# Patient Record
Sex: MALE | Race: WHITE | NOT HISPANIC OR LATINO | Employment: OTHER | URBAN - METROPOLITAN AREA
[De-identification: names, ages, dates, MRNs, and addresses within clinical notes are randomized per-mention and may not be internally consistent; named-entity substitution may affect disease eponyms.]

---

## 2019-08-21 ENCOUNTER — OUTSIDE FACILITY SERVICE (OUTPATIENT)
Dept: CARDIOLOGY | Facility: CLINIC | Age: 74
End: 2019-08-21

## 2019-08-21 PROCEDURE — 93018 CV STRESS TEST I&R ONLY: CPT | Performed by: INTERNAL MEDICINE

## 2019-09-04 ENCOUNTER — CONSULT (OUTPATIENT)
Dept: CARDIOLOGY | Facility: CLINIC | Age: 74
End: 2019-09-04

## 2019-09-04 VITALS
WEIGHT: 266 LBS | OXYGEN SATURATION: 97 % | HEIGHT: 67 IN | HEART RATE: 69 BPM | SYSTOLIC BLOOD PRESSURE: 162 MMHG | DIASTOLIC BLOOD PRESSURE: 96 MMHG | BODY MASS INDEX: 41.75 KG/M2

## 2019-09-04 DIAGNOSIS — R94.39 ABNORMAL MYOCARDIAL PERFUSION STUDY: Primary | ICD-10-CM

## 2019-09-04 DIAGNOSIS — E78.5 DYSLIPIDEMIA: ICD-10-CM

## 2019-09-04 DIAGNOSIS — I10 ESSENTIAL HYPERTENSION: ICD-10-CM

## 2019-09-04 PROCEDURE — 99203 OFFICE O/P NEW LOW 30 MIN: CPT | Performed by: INTERNAL MEDICINE

## 2019-09-04 RX ORDER — OMEPRAZOLE 20 MG/1
20 CAPSULE, DELAYED RELEASE ORAL DAILY
COMMUNITY

## 2019-09-04 RX ORDER — ASPIRIN 81 MG/1
81 TABLET ORAL DAILY
COMMUNITY

## 2019-09-04 RX ORDER — MELOXICAM 15 MG/1
15 TABLET ORAL DAILY
COMMUNITY
End: 2020-09-16

## 2019-09-04 RX ORDER — NEBIVOLOL 10 MG/1
10 TABLET ORAL DAILY
COMMUNITY

## 2019-09-04 NOTE — PROGRESS NOTES
Subjective:     Encounter Date:09/04/2019    Primary Care Physician: Juan Wong MD      Patient ID: Tonya Scott is a 73 y.o. male.    Chief Complaint:Abnormal ECG    PROBLEM LIST:  1. Abnormal MPS/coronary artery disease  a. 8/2019 MPS with fixed inferior defect. EF 50%.  2. Hypertension  3. Sleep apnea on CPAP  4. ADD  5. Arthritis  6. BPH  7. Renal cell carcinoma  a. Partial nephrectomy  8. PUD  9. History of hyperthyroidism treated with radiation therapy  10. Restless leg syndrome  11. GERD  12. Surgeries:  a. Cataract extraction  b. Gastric banding  c. Parathyroid surgery for benign nodule  d. Right partial nephrectomy  e. Right total hip arthroplasty    No Known Allergies      Current Outpatient Medications:   •  amLODIPine (NORVASC) 5 MG tablet, Take 5 mg by mouth daily., Disp: , Rfl:   •  Biotin 5000 MCG capsule, Take 1 tablet by mouth daily., Disp: , Rfl:   •  carbidopa-levodopa (SINEMET)  MG per tablet, Take 1 tablet by mouth every night., Disp: , Rfl:   •  cholecalciferol (VITAMIN D3) 1000 UNITS tablet, Take 3,000 Units by mouth daily., Disp: , Rfl:   •  FLUoxetine (PROzac) 10 MG capsule, Take 10 mg by mouth daily., Disp: , Rfl:   •  losartan (COZAAR) 50 MG tablet, Take 100 mg by mouth daily., Disp: , Rfl:   •  Multiple Vitamins-Minerals (CENTRUM SILVER ULTRA MENS PO), Take 1 tablet by mouth daily., Disp: , Rfl:   •  Omega-3-acid Ethyl Esters (LOVAZA PO), Take 1 tablet by mouth daily., Disp: , Rfl:   •  pravastatin (PRAVACHOL) 40 MG tablet, Take 40 mg by mouth every night., Disp: , Rfl:   •  silodosin (RAPAFLO) 4 MG capsule capsule, Take 8 mg by mouth daily with breakfast., Disp: , Rfl:         History of Present Illness    Patient is a 73-year-old  male who we are seeing today for further evaluation of abnormal myocardial perfusion study.  He has no previous history of coronary disease.  He was recently seen by his primary care physician and given multiple risk factors and no  recent ischemic evaluation it was felt that he should undergo this.  Patient denies any chest pain, pressure, tightness.  Denies any increased shortness of breath.  No syncope, near-syncope, or edema.  Notes that he recently walked 4 miles and was only limited secondary to some arthritic knee pain.  His stress test suggested an old inferior myocardial infarction and due to this he was referred here for further evaluation.  Patient notes that he had been previously told by EKG that he had a previous heart attack but this was felt to be due to body habitus and probably lead placement per his report.    The following portions of the patient's history were reviewed and updated as appropriate: allergies, current medications, past family history, past medical history, past social history, past surgical history and problem list.    Family history: Father with history of myocardial infarction at age 78    Social History     Tobacco Use   • Smoking status: Former Smoker     Years: 5.00   • Smokeless tobacco: Former User     Types: Chew     Quit date: 2011   • Tobacco comment: INTERMITTENT, SOCIALLY   Substance Use Topics   • Alcohol use: Yes     Alcohol/week: 0.6 oz     Types: 1 Cans of beer per week     Comment: MAYBE 1 A WEEK OR LESS   • Drug use: No         Review of Systems   Constitution: Negative for fever, weakness and malaise/fatigue.   HENT: Positive for hearing loss. Negative for nosebleeds.    Eyes: Negative for redness and visual disturbance.   Cardiovascular: Negative for orthopnea, palpitations and paroxysmal nocturnal dyspnea.   Respiratory: Positive for snoring. Negative for cough, sputum production and wheezing.    Hematologic/Lymphatic: Negative for bleeding problem.   Skin: Negative for flushing, itching and rash.   Musculoskeletal: Positive for arthritis. Negative for falls, joint pain and muscle cramps.   Gastrointestinal: Negative for abdominal pain, diarrhea, heartburn, nausea and vomiting.  "  Genitourinary: Negative for hematuria.   Neurological: Negative for excessive daytime sleepiness, dizziness, headaches and tremors.   Psychiatric/Behavioral: Negative for substance abuse. The patient is not nervous/anxious.           Objective:   /96 (BP Location: Left arm)   Pulse 69   Ht 170.2 cm (67\")   Wt 121 kg (266 lb)   SpO2 97%   BMI 41.66 kg/m²          Physical Exam   Constitutional: He is oriented to person, place, and time. He appears well-developed and well-nourished.   obese   HENT:   Head: Normocephalic and atraumatic.   Eyes: Right eye exhibits no discharge. Left eye exhibits no discharge.   Neck: No JVD present. No tracheal deviation present.   Cardiovascular: Normal rate, regular rhythm, normal heart sounds and intact distal pulses. Exam reveals no friction rub.   No murmur heard.  Pulmonary/Chest: Effort normal and breath sounds normal. No respiratory distress.   Abdominal: Soft. Bowel sounds are normal. There is no tenderness.   Musculoskeletal: He exhibits no edema or deformity.   Neurological: He is alert and oriented to person, place, and time.   Skin: Skin is warm and dry.       Procedures          Assessment:   Assessment/Plan      Tonya was seen today for abnormal ecg.    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  No current anginal symptoms.  Medical therapy.    Essential hypertension, elevated today.    Dyslipidemia, on statin therapy.      Plan:  1. Discussed options with the patient regarding his abnormal stress test.  He is completely asymptomatic, and functional class I.  Given his medical therapy for CAD, and asymptomatic status, as well as the lack of ischemia we discussed the options of invasive therapy versus ongoing medical therapy.  We both agree that ongoing medical therapy is most appropriate at this time.  He is to start an exercise program and weight loss.  If he develops exertional symptoms " consistent with angina, we can reconsider invasive therapy at that time.  But for now would simply continue ongoing medical therapy.       Doris ACEVEDO scribed portions of this dictation for Dr. Kennedy Perez.    Dictated utilizing Dragon dictation

## 2020-09-16 ENCOUNTER — OFFICE VISIT (OUTPATIENT)
Dept: CARDIOLOGY | Facility: CLINIC | Age: 75
End: 2020-09-16

## 2020-09-16 VITALS
WEIGHT: 259 LBS | DIASTOLIC BLOOD PRESSURE: 92 MMHG | HEIGHT: 67 IN | SYSTOLIC BLOOD PRESSURE: 142 MMHG | OXYGEN SATURATION: 95 % | BODY MASS INDEX: 40.65 KG/M2 | HEART RATE: 63 BPM

## 2020-09-16 DIAGNOSIS — I25.10 CORONARY ARTERY DISEASE INVOLVING NATIVE CORONARY ARTERY OF NATIVE HEART WITHOUT ANGINA PECTORIS: ICD-10-CM

## 2020-09-16 DIAGNOSIS — E78.00 PURE HYPERCHOLESTEROLEMIA: ICD-10-CM

## 2020-09-16 DIAGNOSIS — I10 ESSENTIAL HYPERTENSION: Primary | ICD-10-CM

## 2020-09-16 PROCEDURE — 99213 OFFICE O/P EST LOW 20 MIN: CPT | Performed by: INTERNAL MEDICINE

## 2020-09-16 NOTE — PROGRESS NOTES
"Subjective:     Encounter Date:09/16/2020      Patient ID: Tonya Scott is a 74 y.o. male.    Chief Complaint: essential hypertension      PROBLEM LIST:  1. Abnormal MPS/coronary artery disease  a. 8/2019 MPS with fixed inferior defect. EF 50%.  2. Hypertension  3. Sleep apnea on CPAP  4. ADD  5. Arthritis  6. BPH  7. Renal cell carcinoma  a. Partial nephrectomy  8. PUD  9. History of hyperthyroidism treated with radiation therapy  10. Restless leg syndrome  11. GERD  12. Surgeries:  a. Cataract extraction  b. Gastric banding  c. Parathyroid surgery for benign nodule  d. Right partial nephrectomy  e. Right total hip arthroplasty    History of Present Illness  Patient returns today for follow up with a history of coronary artery disease abnormal EKG and myocardial vision study.  Since her last visit, he is limited by his left knee orthopedic issues which he is currently getting stem cell treatment for which is improving it somewhat.  He denies any exertional chest pain dyspnea or claudication says \"I can do what I need to\".  But is limited by his orthopedic issues as described above.  Denies orthopnea PND he does wear his CPAP device.  No tachypalpitations..     No Known Allergies      Current Outpatient Medications:   •  amLODIPine (NORVASC) 10 MG tablet, Take 10 mg by mouth Daily., Disp: , Rfl:   •  aspirin 81 MG EC tablet, Take 81 mg by mouth Daily., Disp: , Rfl:   •  Biotin 1000 MCG tablet, Take 2 tablets by mouth Daily., Disp: , Rfl:   •  carbidopa-levodopa (SINEMET)  MG per tablet, Take 1 tablet by mouth every night., Disp: , Rfl:   •  cholecalciferol (VITAMIN D3) 1000 UNITS tablet, Take 3,000 Units by mouth daily., Disp: , Rfl:   •  FLUoxetine (PROzac) 20 MG capsule, Take 20 mg by mouth Daily., Disp: , Rfl:   •  losartan (COZAAR) 100 MG tablet, Take 100 mg by mouth Daily., Disp: , Rfl:   •  Multiple Vitamins-Minerals (CENTRUM SILVER ULTRA MENS PO), Take 1 tablet by mouth daily., Disp: , Rfl:   •  " "nebivolol (BYSTOLIC) 10 MG tablet, Take 10 mg by mouth Daily., Disp: , Rfl:   •  omeprazole (priLOSEC) 20 MG capsule, Take 20 mg by mouth Daily., Disp: , Rfl:   •  vitamin E 100 UNIT capsule, Take 100 Units by mouth Daily., Disp: , Rfl:     The following portions of the patient's history were reviewed and updated as appropriate: allergies, current medications, past family history, past medical history, past social history, past surgical history and problem list.    Review of Systems   Constitution: Negative.   Cardiovascular: Negative for chest pain, dyspnea on exertion, leg swelling, palpitations and syncope.   Respiratory: Negative.  Negative for shortness of breath.    Hematologic/Lymphatic: Negative for bleeding problem. Does not bruise/bleed easily.   Skin: Negative for rash.   Musculoskeletal: Positive for arthritis. Negative for muscle weakness and myalgias.   Gastrointestinal: Negative for heartburn, nausea and vomiting.   Neurological: Negative for dizziness, light-headedness, loss of balance and numbness.          Objective:   Blood pressure 142/92, pulse 63, height 170.2 cm (67\"), weight 117 kg (259 lb), SpO2 95 %.      Vitals signs reviewed.   Constitutional:       Appearance: Well-developed and not in distress.   Neck:      Thyroid: No thyromegaly.      Vascular: No carotid bruit or JVD.   Pulmonary:      Breath sounds: Normal breath sounds.   Cardiovascular:      Regular rhythm.      No gallop. No S3 and S4 gallop.   Edema:     Peripheral edema absent.   Abdominal:      General: Bowel sounds are normal.      Palpations: Abdomen is soft. There is no abdominal mass.      Tenderness: There is no abdominal tenderness.   Musculoskeletal:         General: No deformity.      Extremities: No clubbing present.  Skin:     General: Skin is warm and dry.      Findings: No rash.   Neurological:      Mental Status: Alert and oriented to person, place, and time.         Lab Review:    Procedures        Assessment: "   Tonya was seen today for essential hypertension.    Diagnoses and all orders for this visit:    Essential hypertension    Coronary artery disease involving native coronary artery of native heart without angina pectoris    Pure hypercholesterolemia        Impression  1. Coronary artery disease: Patient with EKG, and myocardial perfusion evidence (2019) of possible prior inferior wall myocardial infarction, which would be asymptomatic.  2. 2.  Hypertension controlled on current losartan and amlodipine  3. 3.  Dyslipidemia on moderate dose statin last LDL at goal per primary physician.    Recommendations  1.  Discussed once again is previous abnormal EKG and myocardial study suggesting prior inferior infarct.  This is truly silent.  2.  As he is asymptomatic, discussed options of further evaluation for now we will simply continue medical therapy.  3.  Given his apparent previous silent ischemia/infarction will need periodic ischemic evaluation  Revisit annually or PRN symptom change    Kennedy Perez MD

## 2021-09-29 ENCOUNTER — OFFICE VISIT (OUTPATIENT)
Dept: CARDIOLOGY | Facility: CLINIC | Age: 76
End: 2021-09-29

## 2021-09-29 VITALS
BODY MASS INDEX: 40.49 KG/M2 | WEIGHT: 258 LBS | DIASTOLIC BLOOD PRESSURE: 78 MMHG | HEIGHT: 67 IN | OXYGEN SATURATION: 98 % | SYSTOLIC BLOOD PRESSURE: 120 MMHG | HEART RATE: 62 BPM

## 2021-09-29 DIAGNOSIS — I10 ESSENTIAL HYPERTENSION: Primary | ICD-10-CM

## 2021-09-29 DIAGNOSIS — E78.00 PURE HYPERCHOLESTEROLEMIA: ICD-10-CM

## 2021-09-29 DIAGNOSIS — R94.39 ABNORMAL NUCLEAR STRESS TEST: ICD-10-CM

## 2021-09-29 PROCEDURE — 99214 OFFICE O/P EST MOD 30 MIN: CPT | Performed by: INTERNAL MEDICINE

## 2021-09-29 RX ORDER — SIMVASTATIN 40 MG
40 TABLET ORAL NIGHTLY
COMMUNITY
End: 2022-09-28 | Stop reason: ALTCHOICE

## 2021-09-29 RX ORDER — SPIRONOLACTONE 25 MG/1
25 TABLET ORAL DAILY
COMMUNITY

## 2021-09-29 RX ORDER — HYDRALAZINE HYDROCHLORIDE 50 MG/1
50 TABLET, FILM COATED ORAL 3 TIMES DAILY
COMMUNITY

## 2021-09-29 NOTE — PROGRESS NOTES
Subjective:     Encounter Date:09/29/2021    Primary Care Physician: Channing Ding DO      Patient ID: Tonya Scott is a 75 y.o. male.    Chief Complaint:Follow-up    PROBLEM LIST:  1. Abnormal MPS/coronary artery disease  a. 8/2019 MPS with fixed inferior defect. EF 50%.  2. Hypertension  3. Sleep apnea on CPAP  4. ADD  5. Arthritis  6. BPH  7. Renal cell carcinoma  a. Partial nephrectomy  8. PUD  9. History of hyperthyroidism treated with radiation therapy  10. Restless leg syndrome  11. GERD  12. Surgeries:  a. Cataract extraction  b. Gastric banding  c. Parathyroid surgery for benign nodule  d. Right partial nephrectomy  e. Right total hip arthroplasty      No Known Allergies      Current Outpatient Medications:   •  amLODIPine (NORVASC) 10 MG tablet, Take 10 mg by mouth Daily., Disp: , Rfl:   •  aspirin 81 MG EC tablet, Take 81 mg by mouth Daily., Disp: , Rfl:   •  azilsartan medoxomil (Edarbi) 80 MG tablet tablet, Take 80 mg by mouth Daily., Disp: , Rfl:   •  Biotin 1000 MCG tablet, Take 2 tablets by mouth Daily., Disp: , Rfl:   •  carbidopa-levodopa (SINEMET)  MG per tablet, Take 1 tablet by mouth every night., Disp: , Rfl:   •  cholecalciferol (VITAMIN D3) 1000 UNITS tablet, Take 2,000 Units by mouth Daily., Disp: , Rfl:   •  FLUoxetine (PROzac) 20 MG capsule, Take 40 mg by mouth Daily., Disp: , Rfl:   •  hydrALAZINE (APRESOLINE) 50 MG tablet, Take 50 mg by mouth 3 (Three) Times a Day., Disp: , Rfl:   •  Multiple Vitamins-Minerals (CENTRUM SILVER ULTRA MENS PO), Take 1 tablet by mouth daily., Disp: , Rfl:   •  nebivolol (BYSTOLIC) 10 MG tablet, Take 10 mg by mouth Daily., Disp: , Rfl:   •  omeprazole (priLOSEC) 20 MG capsule, Take 20 mg by mouth Daily., Disp: , Rfl:   •  simvastatin (ZOCOR) 40 MG tablet, Take 40 mg by mouth Every Night., Disp: , Rfl:   •  spironolactone (ALDACTONE) 25 MG tablet, Take 25 mg by mouth Daily., Disp: , Rfl:   •  vitamin E 100 UNIT capsule, Take 100 Units by  "mouth Daily., Disp: , Rfl:         History of Present Illness    Patient returns today for annual follow-up of coronary artery disease and multiple cardiac risk factors.  Patient since her last visit, has no cardiovascular complaints.  His activity level is increased slightly as his arthritis is improved status post left knee stem cell injections.  Denies orthopnea PND chest pain dyspnea exertion tachypalpitations or any cardiovascular symptoms.    The following portions of the patient's history were reviewed and updated as appropriate: allergies, current medications, past family history, past medical history, past social history, past surgical history and problem list.      Social History     Tobacco Use   • Smoking status: Former Smoker     Years: 5.00   • Smokeless tobacco: Former User     Types: Chew     Quit date: 2011   Substance Use Topics   • Alcohol use: No   • Drug use: No         ROS       Objective:   /78 (BP Location: Left arm)   Pulse 62   Ht 170.2 cm (67\")   Wt 117 kg (258 lb)   SpO2 98%   BMI 40.41 kg/m²         Vitals reviewed.   Constitutional:       Appearance: Well-developed and not in distress.   Neck:      Thyroid: No thyromegaly.      Vascular: No carotid bruit or JVD.   Pulmonary:      Breath sounds: Normal breath sounds.   Cardiovascular:      Regular rhythm.      No gallop. No S3 and S4 gallop.   Abdominal:      General: Bowel sounds are normal.      Palpations: Abdomen is soft. There is no abdominal mass.      Tenderness: There is no abdominal tenderness.   Musculoskeletal:         General: No deformity.      Extremities: No clubbing present.Skin:     General: Skin is warm and dry.      Findings: No rash.   Neurological:      Mental Status: Alert and oriented to person, place, and time.         Procedures          Assessment:   Assessment/Plan      Diagnoses and all orders for this visit:    1. Essential hypertension (Primary)    2. Pure hypercholesterolemia    3. Abnormal " nuclear stress test      1.  Coronary artery disease, silent ischemia.  Abnormal nuclear stress test 2 years ago without symptoms.  2.  Hypertension currently well controlled  3.  Dyslipidemia last LDL of 70 on moderate intensity statin  Sleep apnea on CPAP    Recommendations:  1.  Patient is still asymptomatic, with acceptable functional capacity for patient.  He does not wish further testing at this time.  2.  Continue current medical therapy/preventative strategies.  3.  Patient discussed with symptoms which week to seek medical attention (angina or dyspnea).  4.  Revisit in 1 years time, will likely repeat a perfusion study at that time.       Kennedy Perez MD      Dictated utilizing Dragon dictation

## 2022-09-28 ENCOUNTER — OFFICE VISIT (OUTPATIENT)
Dept: CARDIOLOGY | Facility: CLINIC | Age: 77
End: 2022-09-28

## 2022-09-28 VITALS
HEART RATE: 69 BPM | DIASTOLIC BLOOD PRESSURE: 84 MMHG | WEIGHT: 257 LBS | BODY MASS INDEX: 40.34 KG/M2 | HEIGHT: 67 IN | OXYGEN SATURATION: 98 % | SYSTOLIC BLOOD PRESSURE: 136 MMHG

## 2022-09-28 DIAGNOSIS — I10 PRIMARY HYPERTENSION: ICD-10-CM

## 2022-09-28 DIAGNOSIS — I25.10 CORONARY ARTERY DISEASE INVOLVING NATIVE CORONARY ARTERY OF NATIVE HEART WITHOUT ANGINA PECTORIS: Primary | ICD-10-CM

## 2022-09-28 PROCEDURE — 99213 OFFICE O/P EST LOW 20 MIN: CPT | Performed by: INTERNAL MEDICINE

## 2022-09-28 RX ORDER — ROSUVASTATIN CALCIUM 20 MG/1
20 TABLET, COATED ORAL DAILY
Qty: 90 TABLET | Refills: 3 | Status: SHIPPED | OUTPATIENT
Start: 2022-09-28

## 2022-09-28 NOTE — PROGRESS NOTES
Subjective:     Encounter Date:09/28/2022    Primary Care Physician: Channing Ding DO      Patient ID: Tonya Scott is a 76 y.o. male.    Chief Complaint:Follow-up      PROBLEM LIST:  1. Abnormal MPS/coronary artery disease  a. 8/2019 MPS with fixed inferior defect. EF 50%.  2. Hypertension  3. Sleep apnea on CPAP  4. ADD  5. Arthritis  6. BPH  7. Renal cell carcinoma  a. Partial nephrectomy  8. PUD  9. History of hyperthyroidism treated with radiation therapy  10. Restless leg syndrome  11. GERD  12. Surgeries:  a. Cataract extraction  b. Gastric banding  c. Parathyroid surgery for benign nodule  d. Right partial nephrectomy  e. Right total hip arthroplasty       No Known Allergies      Current Outpatient Medications:   •  amLODIPine (NORVASC) 10 MG tablet, Take 10 mg by mouth Daily., Disp: , Rfl:   •  aspirin 81 MG EC tablet, Take 81 mg by mouth Daily., Disp: , Rfl:   •  azilsartan medoxomil (EDARBI) 80 MG tablet tablet, Take 80 mg by mouth Daily., Disp: , Rfl:   •  Biotin 1000 MCG tablet, Take 2 tablets by mouth Daily., Disp: , Rfl:   •  carbidopa-levodopa (SINEMET)  MG per tablet, Take 1 tablet by mouth every night., Disp: , Rfl:   •  cholecalciferol (VITAMIN D3) 1000 UNITS tablet, Take 2,000 Units by mouth Daily., Disp: , Rfl:   •  FLUoxetine (PROzac) 20 MG capsule, Take 40 mg by mouth Daily., Disp: , Rfl:   •  hydrALAZINE (APRESOLINE) 50 MG tablet, Take 50 mg by mouth 3 (Three) Times a Day., Disp: , Rfl:   •  Multiple Vitamins-Minerals (CENTRUM SILVER ULTRA MENS PO), Take 1 tablet by mouth daily., Disp: , Rfl:   •  nebivolol (BYSTOLIC) 10 MG tablet, Take 10 mg by mouth Daily., Disp: , Rfl:   •  omeprazole (priLOSEC) 20 MG capsule, Take 20 mg by mouth Daily., Disp: , Rfl:   •  simvastatin (ZOCOR) 40 MG tablet, Take 40 mg by mouth Every Night., Disp: , Rfl:   •  spironolactone (ALDACTONE) 25 MG tablet, Take 25 mg by mouth Daily., Disp: , Rfl:   •  vitamin E 100 UNIT capsule, Take 100 Units by  "mouth Daily., Disp: , Rfl:         History of Present Illness    Patient returns today for annual follow-up of coronary artery disease and risk factors.  Since last visit he is doing very well.  He is active no cardiovascular complaints.  Pacifically [place] chest pain dyspnea on exertion or shortness of breath.    The following portions of the patient's history were reviewed and updated as appropriate: allergies, current medications, past family history, past medical history, past social history, past surgical history and problem list.      Social History     Tobacco Use   • Smoking status: Former Smoker     Years: 5.00   • Smokeless tobacco: Former User     Types: Chew     Quit date: 2011   Substance Use Topics   • Alcohol use: No   • Drug use: No         ROS       Objective:   /84   Pulse 69   Ht 170.2 cm (67\")   Wt 117 kg (257 lb)   SpO2 98%   BMI 40.25 kg/m²         Vitals reviewed.   Constitutional:       Appearance: Well-developed and not in distress.   Neck:      Thyroid: No thyromegaly.      Vascular: No carotid bruit or JVD.   Pulmonary:      Breath sounds: Normal breath sounds.   Cardiovascular:      Regular rhythm.      No gallop. No S3 and S4 gallop.   Abdominal:      General: Bowel sounds are normal.      Palpations: Abdomen is soft. There is no abdominal mass.      Tenderness: There is no abdominal tenderness.   Musculoskeletal:         General: No deformity.      Extremities: No clubbing present.Skin:     General: Skin is warm and dry.      Findings: No rash.   Neurological:      Mental Status: Alert and oriented to person, place, and time.         Procedures          Assessment:   Assessment & Plan      Diagnoses and all orders for this visit:    1. Coronary artery disease involving native coronary artery of native heart without angina pectoris (Primary)    2. Primary hypertension      1.  Coronary artery disease, equivocal stress test 3 years ago.  No angina.  On medical therapy  2.  " Dyslipidemia.  Most recent LDL in May of this year was 110.  On moderate intensity statin  3.  Hypertension, well controlled on amlodipine and nebivolol.    Recommendation:  1.  Discontinue simvastatin initiate rosuvastatin 20 g nightly for elevated LDL  2.  Continue medical therapy of CAD.  3.  Revisit annually or as needed symptom change.    Kennedy Perez MD             Dictated utilizing Dragon dictation

## 2024-02-05 NOTE — PROGRESS NOTES
Subjective:     Encounter Date:02/07/2024    Primary Care Physician: Channing Ding DO      Patient ID: Tonya Scott is a 78 y.o. male.    Chief Complaint:Coronary Artery Disease    PROBLEM LIST:  Abnormal MPS/coronary artery disease  8/2019 MPS with fixed inferior defect. EF 50%.  Hypertension  Sleep apnea on CPAP  ADD  Arthritis  BPH  Renal cell carcinoma  Partial nephrectomy  Prostate cancer  Radical prostatectomy 12/23 (Dr. Nguyen)  PUD  History of hyperthyroidism treated with radiation therapy  Restless leg syndrome  GERD  Surgeries:  Cataract extraction  Gastric banding  Parathyroid surgery for benign nodule  Right partial nephrectomy  Right total hip arthroplasty        No Known Allergies      Current Outpatient Medications:     amLODIPine (NORVASC) 10 MG tablet, Take 1 tablet by mouth Daily., Disp: , Rfl:     aspirin 81 MG EC tablet, Take 1 tablet by mouth Daily., Disp: , Rfl:     azilsartan medoxomil (EDARBI) 80 MG tablet tablet, Take 1 tablet by mouth Daily., Disp: , Rfl:     Biotin 1000 MCG tablet, Take 2 tablets by mouth Daily., Disp: , Rfl:     carbidopa-levodopa (SINEMET)  MG per tablet, Take 1 tablet by mouth Every Night., Disp: , Rfl:     cholecalciferol (VITAMIN D3) 1000 UNITS tablet, Take 2 tablets by mouth Daily., Disp: , Rfl:     FLUoxetine (PROzac) 20 MG capsule, Take 2 capsules by mouth Daily., Disp: , Rfl:     hydrALAZINE (APRESOLINE) 50 MG tablet, Take 1 tablet by mouth 3 (Three) Times a Day., Disp: , Rfl:     Multiple Vitamins-Minerals (CENTRUM SILVER ULTRA MENS PO), Take 1 tablet by mouth Daily., Disp: , Rfl:     nebivolol (BYSTOLIC) 10 MG tablet, Take 1 tablet by mouth Daily., Disp: , Rfl:     omeprazole (priLOSEC) 20 MG capsule, Take 1 capsule by mouth Daily., Disp: , Rfl:     rosuvastatin (CRESTOR) 20 MG tablet, Take 1 tablet by mouth Daily., Disp: 90 tablet, Rfl: 3    spironolactone (ALDACTONE) 25 MG tablet, Take 1 tablet by mouth Daily., Disp: , Rfl:     vitamin E 100  "UNIT capsule, Take 1 capsule by mouth Daily., Disp: , Rfl:         History of Present Illness    Patient returns today for routine follow-up of abnormal EKG and hypertension.  Since her last visit, he underwent radical prostatectomy 1 month ago due to prostate cancer.  He did well with that without any cardiovascular symptoms.  He was previously active no angina shortness of breath orthopnea PND or claudication.  His blood pressure at home running 140s over 80s predominantly.  Slightly elevated today likely due to recent surgery.    The following portions of the patient's history were reviewed and updated as appropriate: allergies, current medications, past family history, past medical history, past social history, past surgical history and problem list.      Social History     Tobacco Use    Smoking status: Former     Years: 5     Types: Cigarettes    Smokeless tobacco: Former     Types: Chew     Quit date: 2011   Substance Use Topics    Alcohol use: No    Drug use: No         ROS       Objective:   /83   Pulse 73   Ht 170.2 cm (67\")   Wt 117 kg (257 lb)   SpO2 97%   BMI 40.25 kg/m²         Vitals reviewed.   Constitutional:       Appearance: Well-developed and not in distress.   Neck:      Thyroid: No thyromegaly.      Vascular: No carotid bruit or JVD.   Pulmonary:      Breath sounds: Normal breath sounds.   Cardiovascular:      Regular rhythm.      No gallop. No S3 and S4 gallop.   Pulses:     Intact distal pulses.      Carotid: 2+ bilaterally.     Radial: 2+ bilaterally.  Edema:     Peripheral edema absent.   Abdominal:      General: Bowel sounds are normal.      Palpations: Abdomen is soft. There is no abdominal mass.      Tenderness: There is no abdominal tenderness.   Musculoskeletal:         General: No deformity.      Extremities: No clubbing present.Skin:     General: Skin is warm and dry.      Findings: No rash.   Neurological:      Mental Status: Alert and oriented to person, place, and time. "         Procedures          Assessment:   Assessment & Plan      Diagnoses and all orders for this visit:    1. Primary hypertension (Primary)      1.  Hypertension, overall well-controlled.  Elevated today, first high reading in quite some time likely related to recent stressors/surgery.  2.  Sleep apnea on CPAP  3.  Dyslipidemia, last LDL well-controlled on rosuvastatin    Recommendations:  1.  Continue current medical therapy.  2.  Follow blood pressure at home.  Would not change medications at this time.  3.  Revisit annually apparent symptom change         Advance Care Planning   ACP discussion was held with the patient during this visit. Patient has an advance directive (not in EMR), copy requested.      Kennedy Perez MD    Dictated utilizing Dragon dictation

## 2024-02-07 ENCOUNTER — OFFICE VISIT (OUTPATIENT)
Dept: CARDIOLOGY | Facility: CLINIC | Age: 79
End: 2024-02-07
Payer: MEDICARE

## 2024-02-07 VITALS
BODY MASS INDEX: 40.34 KG/M2 | DIASTOLIC BLOOD PRESSURE: 83 MMHG | HEIGHT: 67 IN | SYSTOLIC BLOOD PRESSURE: 167 MMHG | OXYGEN SATURATION: 97 % | HEART RATE: 73 BPM | WEIGHT: 257 LBS

## 2024-02-07 DIAGNOSIS — I10 PRIMARY HYPERTENSION: Primary | ICD-10-CM

## 2024-02-07 PROCEDURE — 1160F RVW MEDS BY RX/DR IN RCRD: CPT | Performed by: INTERNAL MEDICINE

## 2024-02-07 PROCEDURE — 99213 OFFICE O/P EST LOW 20 MIN: CPT | Performed by: INTERNAL MEDICINE

## 2024-02-07 PROCEDURE — 1159F MED LIST DOCD IN RCRD: CPT | Performed by: INTERNAL MEDICINE

## 2024-02-07 PROCEDURE — 3079F DIAST BP 80-89 MM HG: CPT | Performed by: INTERNAL MEDICINE

## 2024-02-07 PROCEDURE — 3077F SYST BP >= 140 MM HG: CPT | Performed by: INTERNAL MEDICINE

## 2025-02-11 NOTE — PROGRESS NOTES
Subjective:     Encounter Date:02/12/2025    Primary Care Physician: Channing Ding DO      Patient ID: Tonya Scott is a 79 y.o. male.    Chief Complaint:Follow-up (1 year )    PROBLEM LIST:  Abnormal MPS/coronary artery disease  8/2019 MPS with fixed inferior defect. EF 50%.  Hypertension  Sleep apnea on CPAP  ADD  Arthritis  BPH  Renal cell carcinoma  Partial nephrectomy  Prostate cancer  Radical prostatectomy 12/23 (Dr. Nguyen)  PUD  History of hyperthyroidism treated with radiation therapy  Restless leg syndrome  GERD  Surgeries:  Cataract extraction  Gastric banding  Parathyroid surgery for benign nodule  Right partial nephrectomy  Right total hip arthroplasty  Right knee replacement        No Known Allergies      Current Outpatient Medications:     amLODIPine (NORVASC) 10 MG tablet, Take 1 tablet by mouth Daily., Disp: , Rfl:     aspirin 81 MG EC tablet, Take 1 tablet by mouth Daily., Disp: , Rfl:     azilsartan medoxomil (EDARBI) 80 MG tablet tablet, Take 1 tablet by mouth Daily., Disp: , Rfl:     Biotin 1000 MCG tablet, Take 2 tablets by mouth Daily., Disp: , Rfl:     carbidopa-levodopa (SINEMET)  MG per tablet, Take 1 tablet by mouth Every Night., Disp: , Rfl:     cholecalciferol (VITAMIN D3) 1000 UNITS tablet, Take 2 tablets by mouth Daily., Disp: , Rfl:     FLUoxetine (PROzac) 20 MG capsule, Take 2 capsules by mouth Daily., Disp: , Rfl:     Gemtesa 75 MG tablet, , Disp: , Rfl:     hydrALAZINE (APRESOLINE) 50 MG tablet, Take 1 tablet by mouth 3 (Three) Times a Day., Disp: , Rfl:     Multiple Vitamins-Minerals (CENTRUM SILVER ULTRA MENS PO), Take 1 tablet by mouth Daily., Disp: , Rfl:     nebivolol (BYSTOLIC) 10 MG tablet, Take 1 tablet by mouth Daily., Disp: , Rfl:     omeprazole (priLOSEC) 20 MG capsule, Take 1 capsule by mouth Daily., Disp: , Rfl:     rosuvastatin (CRESTOR) 20 MG tablet, Take 1 tablet by mouth Daily., Disp: 90 tablet, Rfl: 3    vitamin E 100 UNIT capsule, Take 1 capsule  "by mouth Daily., Disp: , Rfl:     spironolactone (ALDACTONE) 25 MG tablet, Take 1 tablet by mouth Daily. (Patient not taking: Reported on 2/12/2025), Disp: , Rfl:         History of Present Illness    Patient returns today for annual follow-up of cardiovascular risk factors and possible CAD.  Since her last visit, the patient has no cardiovascular complaints.  Underwent right knee replacement without incident and is recovered well.  No exertional chest pain dyspnea orthopnea PND claudication.  Blood pressures from home have been reviewed blood pressures are consistently 130s systolic.  No significant change with hydralazine    The following portions of the patient's history were reviewed and updated as appropriate: allergies, current medications, past family history, past medical history, past social history, past surgical history and problem list.      Social History     Tobacco Use    Smoking status: Former     Types: Cigarettes    Smokeless tobacco: Former     Types: Chew     Quit date: 2011   Substance Use Topics    Alcohol use: No    Drug use: No         ROS       Objective:   /85   Pulse 62   Ht 172.7 cm (68\")   Wt 117 kg (258 lb 12.8 oz)   SpO2 91%   BMI 39.35 kg/m²         Vitals reviewed.   Constitutional:       Appearance: Well-developed and not in distress.   Neck:      Thyroid: No thyromegaly.      Vascular: No carotid bruit or JVD.   Pulmonary:      Breath sounds: Normal breath sounds.   Cardiovascular:      Regular rhythm.      No gallop. No S3 and S4 gallop.   Pulses:     Intact distal pulses.      Carotid: 2+ bilaterally.     Radial: 2+ bilaterally.  Edema:     Peripheral edema absent.   Abdominal:      General: Bowel sounds are normal.      Palpations: Abdomen is soft. There is no abdominal mass.      Tenderness: There is no abdominal tenderness.   Musculoskeletal:         General: No deformity.      Extremities: No clubbing present.Skin:     General: Skin is warm and dry.      Findings: " No rash.   Neurological:      Mental Status: Alert and oriented to person, place, and time.         Procedures          Assessment:   Assessment & Plan      Diagnoses and all orders for this visit:    1. Primary hypertension (Primary)    Other orders  -     aspirin 81 MG EC tablet; Take 1 tablet by mouth Daily.      1.  Abnormal MPS, no angina.  Continue medical therapy  2.  Hypertension, well-controlled by home blood pressure log.  3.  Sleep apnea on CPAP  4.  Dyslipidemia on high intensity statin.  No recent LDL available, due to be checked by primary physician next month.    Recommendations:  1.  Continue current medical therapy.  2.  Keep on home blood pressure log.  Would not increase his medications at this time.  If his blood pressure does creep up, consider the addition of HCTZ/thiazide type diuretic.  3.  Revisit annually or as needed symptom change         Advance Care Planning   ACP discussion was held with the patient during this visit. Patient has an advance directive in EMR which is still valid.       Kennedy Perez MD    Dictated utilizing Dragon dictation

## 2025-02-12 ENCOUNTER — OFFICE VISIT (OUTPATIENT)
Dept: CARDIOLOGY | Facility: CLINIC | Age: 80
End: 2025-02-12
Payer: MEDICARE

## 2025-02-12 VITALS
DIASTOLIC BLOOD PRESSURE: 85 MMHG | HEART RATE: 62 BPM | OXYGEN SATURATION: 91 % | BODY MASS INDEX: 39.22 KG/M2 | WEIGHT: 258.8 LBS | SYSTOLIC BLOOD PRESSURE: 150 MMHG | HEIGHT: 68 IN

## 2025-02-12 DIAGNOSIS — I10 PRIMARY HYPERTENSION: Primary | ICD-10-CM

## 2025-02-12 PROCEDURE — 3079F DIAST BP 80-89 MM HG: CPT | Performed by: INTERNAL MEDICINE

## 2025-02-12 PROCEDURE — 1160F RVW MEDS BY RX/DR IN RCRD: CPT | Performed by: INTERNAL MEDICINE

## 2025-02-12 PROCEDURE — 3077F SYST BP >= 140 MM HG: CPT | Performed by: INTERNAL MEDICINE

## 2025-02-12 PROCEDURE — 1159F MED LIST DOCD IN RCRD: CPT | Performed by: INTERNAL MEDICINE

## 2025-02-12 PROCEDURE — 99214 OFFICE O/P EST MOD 30 MIN: CPT | Performed by: INTERNAL MEDICINE

## 2025-02-12 RX ORDER — VIBEGRON 75 MG/1
TABLET, FILM COATED ORAL
COMMUNITY
Start: 2024-12-27

## 2025-02-12 RX ORDER — ROSUVASTATIN CALCIUM 20 MG/1
20 TABLET, COATED ORAL DAILY
Qty: 90 TABLET | Refills: 3 | Status: SHIPPED | OUTPATIENT
Start: 2025-02-12

## 2025-02-12 RX ORDER — ASPIRIN 81 MG/1
81 TABLET ORAL DAILY
Start: 2025-02-12